# Patient Record
Sex: FEMALE | URBAN - METROPOLITAN AREA
[De-identification: names, ages, dates, MRNs, and addresses within clinical notes are randomized per-mention and may not be internally consistent; named-entity substitution may affect disease eponyms.]

---

## 2019-05-28 ENCOUNTER — NURSE TRIAGE (OUTPATIENT)
Dept: NURSING | Facility: CLINIC | Age: 39
End: 2019-05-28

## 2019-05-29 NOTE — TELEPHONE ENCOUNTER
"Sloane has been having cramping stomach pain for about a week.    The pain is across her lower abdomen.    She developed diarrhea a couple days later.    She reports traces of blood with the watery diarrhea.    Per protocol, ER Advised.    Sarah Lyn RN  [facility]        Reason for Disposition    Blood in bowel movements   (Exception: blood on surface of BM with constipation)    Additional Information    Negative: Shock suspected (e.g., cold/pale/clammy skin, too weak to stand, low BP, rapid pulse)    Negative: Difficult to awaken or acting confused (e.g., disoriented, slurred speech)    Negative: Passed out (i.e., lost consciousness, collapsed and was not responding)    Negative: Sounds like a life-threatening emergency to the triager    Negative: [1] SEVERE pain (e.g., excruciating) AND [2] present > 1 hour    Negative: [1] SEVERE pain AND [2] age > 60    Negative: [1] Vomiting AND [2] contains red blood or black (\"coffee ground\") material  (Exception: few red streaks in vomit that only happened once)    Protocols used: ABDOMINAL PAIN - FEMALE-A-AH      "